# Patient Record
Sex: FEMALE | Race: BLACK OR AFRICAN AMERICAN | Employment: STUDENT | URBAN - METROPOLITAN AREA
[De-identification: names, ages, dates, MRNs, and addresses within clinical notes are randomized per-mention and may not be internally consistent; named-entity substitution may affect disease eponyms.]

---
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
## 2023-03-28 NOTE — ED AVS SNAPSHOT
THE [facility] Emergency Department in [address] [facility]    Phone:  [phone]    Fax:  Jagdeep   MRN: [id number]    Department:  THE [facility] Emergency Department in [place]   Date of Visit: IF THERE IS ANY CHANGE OR WORSENING OF YOUR CONDITION, CALL YOUR PRIMARY CARE PHYSICIAN AT ONCE OR RETURN IMMEDIATELY TO THE EMERGENCY DEPARTMENT.     If you have been prescribed any medication(s), please fill your prescription right away and begin taking t FROM

## (undated) NOTE — ED AVS SNAPSHOT
THE [facility] Emergency Department in [address] [facility]    Phone:  [phone]    Fax:  Jagdeep   MRN: [id number]    Department:  THE [facility] Emergency Department in [place]   Date of Visit: Expect to receive an electronic request (by e-mail or text) to complete a self-assessment the day after your visit. You may also receive a call from our patient liason soon after your visit.  Also, some patients receive a detailed feedback survey mailed to [facility] [address]  ([address]) [address] [facility] [phone] [address] ([address] [place] [address]) [phone]   [facility] [place] [address] [address] ( she rolled her left ankle while running in gym class. Patient complains of pain to lateral malleolus. Painful to bear weight. FINDINGS:  Benign 10 x 5 mm sclerotic focus in the distal diaphysis of the tibia.  The ankle mortise and talar dome appear

## (undated) NOTE — LETTER
May 18, 2017    Patient: Dulce Caruso   Date of Visit: 5/18/2017       To Whom It May Concern:    Dulce Caruso was seen and treated in our emergency department on 5/18/2017.  She should not return to school until 5/19/17 and should not return to